# Patient Record
Sex: MALE | Race: WHITE | HISPANIC OR LATINO | Employment: UNEMPLOYED | URBAN - METROPOLITAN AREA
[De-identification: names, ages, dates, MRNs, and addresses within clinical notes are randomized per-mention and may not be internally consistent; named-entity substitution may affect disease eponyms.]

---

## 2023-08-21 ENCOUNTER — HOSPITAL ENCOUNTER (EMERGENCY)
Facility: HOSPITAL | Age: 28
Discharge: HOME | End: 2023-08-21
Attending: EMERGENCY MEDICINE
Payer: OTHER GOVERNMENT

## 2023-08-21 VITALS
WEIGHT: 224.43 LBS | HEART RATE: 78 BPM | OXYGEN SATURATION: 98 % | TEMPERATURE: 98.9 F | DIASTOLIC BLOOD PRESSURE: 65 MMHG | HEIGHT: 69 IN | SYSTOLIC BLOOD PRESSURE: 135 MMHG | BODY MASS INDEX: 33.24 KG/M2 | RESPIRATION RATE: 16 BRPM

## 2023-08-21 DIAGNOSIS — T67.1XXA HEAT SYNCOPE, INITIAL ENCOUNTER: Primary | ICD-10-CM

## 2023-08-21 LAB
ALBUMIN SERPL-MCNC: 5.1 G/DL (ref 3.5–5.2)
ALBUMIN/GLOB SERPL: 1.5 G/DL
ALP SERPL-CCNC: 75 U/L (ref 39–117)
ALT SERPL W P-5'-P-CCNC: 30 U/L (ref 1–41)
ANION GAP SERPL CALCULATED.3IONS-SCNC: 13.2 MMOL/L (ref 5–15)
AST SERPL-CCNC: 22 U/L (ref 1–40)
BASOPHILS # BLD AUTO: 0.04 10*3/MM3 (ref 0–0.2)
BASOPHILS NFR BLD AUTO: 0.3 % (ref 0–1.5)
BILIRUB SERPL-MCNC: 1.1 MG/DL (ref 0–1.2)
BUN SERPL-MCNC: 16 MG/DL (ref 6–20)
BUN/CREAT SERPL: 12.8 (ref 7–25)
CALCIUM SPEC-SCNC: 9.6 MG/DL (ref 8.6–10.5)
CHLORIDE SERPL-SCNC: 98 MMOL/L (ref 98–107)
CK SERPL-CCNC: 303 U/L (ref 20–200)
CO2 SERPL-SCNC: 24.8 MMOL/L (ref 22–29)
CREAT SERPL-MCNC: 1.25 MG/DL (ref 0.76–1.27)
DEPRECATED RDW RBC AUTO: 39.2 FL (ref 37–54)
EGFRCR SERPLBLD CKD-EPI 2021: 80.4 ML/MIN/1.73
EOSINOPHIL # BLD AUTO: 0.07 10*3/MM3 (ref 0–0.4)
EOSINOPHIL NFR BLD AUTO: 0.4 % (ref 0.3–6.2)
ERYTHROCYTE [DISTWIDTH] IN BLOOD BY AUTOMATED COUNT: 12.9 % (ref 12.3–15.4)
GLOBULIN UR ELPH-MCNC: 3.4 GM/DL
GLUCOSE SERPL-MCNC: 107 MG/DL (ref 65–99)
HCT VFR BLD AUTO: 41 % (ref 37.5–51)
HGB BLD-MCNC: 14.2 G/DL (ref 13–17.7)
HOLD SPECIMEN: NORMAL
HOLD SPECIMEN: NORMAL
IMM GRANULOCYTES # BLD AUTO: 0.05 10*3/MM3 (ref 0–0.05)
IMM GRANULOCYTES NFR BLD AUTO: 0.3 % (ref 0–0.5)
LYMPHOCYTES # BLD AUTO: 1.66 10*3/MM3 (ref 0.7–3.1)
LYMPHOCYTES NFR BLD AUTO: 10.7 % (ref 19.6–45.3)
MAGNESIUM SERPL-MCNC: 2 MG/DL (ref 1.6–2.6)
MCH RBC QN AUTO: 29 PG (ref 26.6–33)
MCHC RBC AUTO-ENTMCNC: 34.6 G/DL (ref 31.5–35.7)
MCV RBC AUTO: 83.7 FL (ref 79–97)
MONOCYTES # BLD AUTO: 1.05 10*3/MM3 (ref 0.1–0.9)
MONOCYTES NFR BLD AUTO: 6.7 % (ref 5–12)
NEUTROPHILS NFR BLD AUTO: 12.7 10*3/MM3 (ref 1.7–7)
NEUTROPHILS NFR BLD AUTO: 81.6 % (ref 42.7–76)
NRBC BLD AUTO-RTO: 0 /100 WBC (ref 0–0.2)
PLATELET # BLD AUTO: 266 10*3/MM3 (ref 140–450)
PMV BLD AUTO: 10.3 FL (ref 6–12)
POTASSIUM SERPL-SCNC: 3.8 MMOL/L (ref 3.5–5.2)
PROT SERPL-MCNC: 8.5 G/DL (ref 6–8.5)
RBC # BLD AUTO: 4.9 10*6/MM3 (ref 4.14–5.8)
SODIUM SERPL-SCNC: 136 MMOL/L (ref 136–145)
WBC NRBC COR # BLD: 15.57 10*3/MM3 (ref 3.4–10.8)
WHOLE BLOOD HOLD COAG: NORMAL
WHOLE BLOOD HOLD SPECIMEN: NORMAL

## 2023-08-21 PROCEDURE — 85025 COMPLETE CBC W/AUTO DIFF WBC: CPT | Performed by: EMERGENCY MEDICINE

## 2023-08-21 PROCEDURE — 99283 EMERGENCY DEPT VISIT LOW MDM: CPT

## 2023-08-21 PROCEDURE — 83735 ASSAY OF MAGNESIUM: CPT

## 2023-08-21 PROCEDURE — 82550 ASSAY OF CK (CPK): CPT

## 2023-08-21 PROCEDURE — 80053 COMPREHEN METABOLIC PANEL: CPT

## 2023-08-21 PROCEDURE — 36415 COLL VENOUS BLD VENIPUNCTURE: CPT | Performed by: EMERGENCY MEDICINE

## 2023-08-21 RX ORDER — ACETAMINOPHEN 325 MG/1
650 TABLET ORAL ONCE
Status: COMPLETED | OUTPATIENT
Start: 2023-08-21 | End: 2023-08-21

## 2023-08-21 RX ADMIN — ACETAMINOPHEN 650 MG: 325 TABLET ORAL at 22:38

## 2023-08-21 NOTE — ED TRIAGE NOTES
Patient Seen in: [facility] AND [facility] Emergency Department    History   Patient presents with:  Dyspnea NEMO SOB (respiratory)    Stated Complaint:     HPI    68 yo male with progressive shortness of breath that is worse with exertion. He has had a cough.  No Pt to ED from [place] stating he overheated and passed out today at around 1645.    Pt currently awake, alert, oriented.     range of motion. He exhibits no edema or tenderness. Lymphadenopathy:     He has no cervical adenopathy. Neurological: He is alert and oriented to person, place, and time. No cranial nerve deficit. Skin: Skin is warm and dry.    Psychiatric: He has a 0257  ------------------------------------------------------------      MDM   AP CHEST RADIOGRAPH at 1:21 AM    Comparison: None    IMPRESSION    The cardiac silhouette is moderately enlarged.    Vascular pedicle is narrow suspect for decreased intravascula

## 2023-08-21 NOTE — Clinical Note
[facility] EMERGENCY ROOM  [address]  [place] [address]  Phone: [phone]    Wero Kay was seen and treated in our emergency department on 8/21/2023.  He may return to work on 08/23/2023.         Thank you for choosing [facility].    Preet Burris MD

## 2023-08-21 NOTE — ED PROVIDER NOTES
"Time: 7:22 PM EDT  Date of encounter:  8/21/2023  Independent Historian/Clinical History and Information was obtained by:   Patient    History is limited by: N/A      Chief complaint: Heat exhaustion, syncope        History of Present Illness:  Patient is a 27 y.o. year old male who presents to the emergency department for evaluation of heat exhaustion.  Patient is in the .  States he got overheated outside after doing [employer] field exercises in the heat for several hours and 90+ degree weather today.      Had a presyncopal episode.  States vision tunneled in and lost his hearing but caught himself.  Denies any chest pain, palpitations, shortness of breath.     Was feeling fine this morning and no recent illnesses or sick contacts or fevers.      HPI    Patient Care Team  Primary Care Provider: Provider, No Known    Past Medical History:   Reviewed, otherwise healthy  No Known Allergies  History reviewed. No pertinent past medical history.  History reviewed. No pertinent surgical history.  History reviewed. No pertinent family history.    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    Review of Systems:  Review of Systems   Eyes:  Positive for visual disturbance (resolved).   Cardiovascular:  Negative for chest pain and palpitations.   Gastrointestinal:  Negative for diarrhea, nausea and vomiting.   Neurological:  Positive for syncope (\"split second, caught himself\").      Physical Exam:  /65   Pulse 78   Temp 98.9 øF (37.2 øC) (Oral)   Resp 16   Ht 175.3 cm (69\")   Wt 102 kg (224 lb 6.9 oz)   SpO2 98%   BMI 33.14 kg/mý         Physical Exam  Constitutional:       Appearance: Normal appearance.   HENT:      Head: " Normocephalic.   Eyes:      Extraocular Movements: Extraocular movements intact.      Conjunctiva/sclera: Conjunctivae normal.   Pulmonary:      Effort: Pulmonary effort is normal.   Abdominal:      General: There is no distension.   Skin:     General: Skin is warm.      Coloration: Skin is not cyanotic.   Neurological:      Mental Status: He is alert and oriented to person, place, and time.   Psychiatric:         Attention and Perception: Attention and perception normal.         Mood and Affect: Mood normal.                    Procedures:  Procedures      Medical Decision Making:      Comorbidities that affect care:    None    External Notes reviewed:    None      The following orders were placed and all results were independently analyzed by me:  Orders Placed This Encounter   Procedures    Comprehensive Metabolic Panel    [place] Draw    CK    Magnesium    CBC Auto Differential    CBC & Differential    Green Top (Gel)    Lavender Top    Gold Top - SST    Light Blue Top       Medications Given in the Emergency Department:  Medications   acetaminophen (TYLENOL) tablet 650 mg (650 mg Oral Given 8/21/23 2238)        ED Course:         Labs:    Lab Results (last 24 hours)       Procedure Component Value Units Date/Time    CBC & Differential [[id number]]  (Abnormal) Collected: 08/21/23 1927    Specimen: Blood from Arm, Right Updated: 08/21/23 1944    Narrative:      The following orders were created for panel order CBC & Differential.  Procedure                               Abnormality         Status                     ---------                               -----------         ------                     CBC Auto Differential[[id number]]        Abnormal            Final result                 Please view results for these tests on the individual orders.    Comprehensive Metabolic Panel [[id number]]  (Abnormal) Collected: 08/21/23 1927    Specimen: Blood from Arm, Right Updated: 08/21/23 2010     Glucose 107 mg/dL       BUN 16 mg/dL      Creatinine 1.25 mg/dL      Sodium 136 mmol/L      Potassium 3.8 mmol/L      Chloride 98 mmol/L      CO2 24.8 mmol/L      Calcium 9.6 mg/dL      Total Protein 8.5 g/dL      Albumin 5.1 g/dL      ALT (SGPT) 30 U/L      AST (SGOT) 22 U/L      Alkaline Phosphatase 75 U/L      Total Bilirubin 1.1 mg/dL      Globulin 3.4 gm/dL      A/G Ratio 1.5 g/dL      BUN/Creatinine Ratio 12.8     Anion Gap 13.2 mmol/L      eGFR 80.4 mL/min/1.73     Narrative:      GFR Normal >60  Chronic Kidney Disease <60  Kidney Failure <15      CK [[id number]]  (Abnormal) Collected: 08/21/23 1927    Specimen: Blood from Arm, Right Updated: 08/21/23 2010     Creatine Kinase 303 U/L     Magnesium [[id number]]  (Normal) Collected: 08/21/23 1927    Specimen: Blood from Arm, Right Updated: 08/21/23 2010     Magnesium 2.0 mg/dL     CBC Auto Differential [[id number]]  (Abnormal) Collected: 08/21/23 1927    Specimen: Blood from Arm, Right Updated: 08/21/23 1944     WBC 15.57 10*3/mm3      RBC 4.90 10*6/mm3      Hemoglobin 14.2 g/dL      Hematocrit 41.0 %      MCV 83.7 fL      MCH 29.0 pg      MCHC 34.6 g/dL      RDW 12.9 %      RDW-SD 39.2 fl      MPV 10.3 fL      Platelets 266 10*3/mm3      Neutrophil % 81.6 %      Lymphocyte % 10.7 %      Monocyte % 6.7 %      Eosinophil % 0.4 %      Basophil % 0.3 %      Immature Grans % 0.3 %      Neutrophils, Absolute 12.70 10*3/mm3      Lymphocytes, Absolute 1.66 10*3/mm3      Monocytes, Absolute 1.05 10*3/mm3      Eosinophils, Absolute 0.07 10*3/mm3      Basophils, Absolute 0.04 10*3/mm3      Immature Grans, Absolute 0.05 10*3/mm3      nRBC 0.0 /100 WBC              Imaging:    No Radiology Exams Resulted Within Past 24 Hours      Differential Diagnosis and Discussion:      Syncope: Differential diagnosis includes but is not limited to TIA, hyperventilation, aortic stenosis, pulmonary emboli, myocardial disease, bradycardia arrhythmia, heart block, tachyarrhythmia, vasovagal, orthostatic hypotension,  ruptured AAA, aortic dissection, subarachnoid hemorrhage, seizure, hypoglycemia.    All labs were reviewed and interpreted by me.    MDM     Amount and/or Complexity of Data Reviewed  Clinical lab tests: reviewed           This patient is a otherwise healthy 27-year-old male who is active duty [employer] and was doing field exercises outside in the 90 degree heat.      It sounds mostly like he had a near syncopal episode where he was getting tunnel vision and had to sit down and may have briefly passed out for a second or 2.    While waiting to be seen in the ED in the air conditioning and he had already hydrated orally prior to arrival he is feeling much better and just has a mild headache.    Vital signs are stable and lab work was only notable for elevated white blood cell count which I attribute to acute stress response from heat exhaustion.    He has no recent illnesses or new medications or chest pain or palpitations.    He has already been up and ambulatory here with no issues or lightheadedness I think he be safely discharged home to rest in the air conditioning and rehydrate himself orally.                  Patient Care Considerations:          Consultants/Shared Management Plan:        Social Determinants of Health:    Patient is independent, reliable, and has access to care.       Disposition and Care Coordination:    Discharged: The patient is suitable and stable for discharge with no need for consideration of observation or admission.    I have explained the patient's condition, diagnoses and treatment plan based on the information available to me at this time. I have answered questions and addressed any concerns. The patient has a good  understanding of the patient's diagnosis, condition, and treatment plan as can be expected at this point. The vital signs have been stable. The patient's condition is stable and appropriate for discharge from the emergency department.      The patient will pursue further  outpatient evaluation with the primary care physician or other designated or consulting physician as outlined in the discharge instructions. They are agreeable to this plan of care and follow-up instructions have been explained in detail. The patient has received these instructions in written format and have expressed an understanding of the discharge instructions. The patient is aware that any significant change in condition or worsening of symptoms should prompt an immediate return to this or the closest emergency department or call to 911.  I have explained discharge medications and the need for follow up with the patient/caretakers. This was also printed in the discharge instructions. Patient was discharged with the following medications and follow up:      Medication List      No changes were made to your prescriptions during this visit.      [facility] Known  [facility] [place] [address]    Call in 2 days  As needed, If symptoms worsen, for a follow-up appointment       Final diagnoses:   Heat syncope, initial encounter        ED Disposition       ED Disposition   Discharge    Condition   Stable    Comment   --               This medical record created using voice recognition software.             Preet Burris MD  08/21/23 1711

## 2023-08-22 NOTE — DISCHARGE INSTRUCTIONS
It sounds like you passed out in the setting of heat exhaustion today.    Try to stay in the air conditioning and away from the heat and drink plenty of cold fluids to stay hydrated.    Return to the ER if anything gets worse.